# Patient Record
Sex: MALE | Race: WHITE | NOT HISPANIC OR LATINO | Employment: OTHER | URBAN - METROPOLITAN AREA
[De-identification: names, ages, dates, MRNs, and addresses within clinical notes are randomized per-mention and may not be internally consistent; named-entity substitution may affect disease eponyms.]

---

## 2017-08-23 ENCOUNTER — OFFICE VISIT (OUTPATIENT)
Dept: OPHTHALMOLOGY | Facility: CLINIC | Age: 62
End: 2017-08-23

## 2017-08-23 DIAGNOSIS — H40.003 GLAUCOMA SUSPECT OF BOTH EYES: ICD-10-CM

## 2017-08-23 DIAGNOSIS — H52.00 HYPERMETROPIA, UNSPECIFIED LATERALITY: Primary | ICD-10-CM

## 2017-08-23 ASSESSMENT — CONF VISUAL FIELD
OS_NORMAL: 1
OD_NORMAL: 1

## 2017-08-23 ASSESSMENT — SLIT LAMP EXAM - LIDS
COMMENTS: NORMAL
COMMENTS: NORMAL

## 2017-08-23 ASSESSMENT — REFRACTION_MANIFEST
OD_CYLINDER: +0.75
OD_AXIS: 015
OS_AXIS: 015
OD_ADD: +2.50
OS_ADD: +2.50
OS_CYLINDER: +0.50
OS_SPHERE: +1.00
OD_SPHERE: +1.00

## 2017-08-23 ASSESSMENT — REFRACTION_WEARINGRX
OS_AXIS: 008
OD_ADD: +2.25
OD_CYLINDER: +0.50
SPECS_TYPE: PAL
OS_CYLINDER: +0.25
OD_AXIS: 028
OS_SPHERE: +0.50
OS_ADD: +2.25
OD_SPHERE: +0.50

## 2017-08-23 ASSESSMENT — TONOMETRY
OD_IOP_MMHG: 18
OS_IOP_MMHG: 17
IOP_METHOD: APPLANATION

## 2017-08-23 ASSESSMENT — PACHYMETRY
OD_CT(UM): 517
OS_CT(UM): 518

## 2017-08-23 ASSESSMENT — VISUAL ACUITY
METHOD: SNELLEN - LINEAR
OS_CC+: -2
OD_CC: 20/20
OS_CC: 20/20

## 2017-08-23 ASSESSMENT — EXTERNAL EXAM - RIGHT EYE: OD_EXAM: NORMAL

## 2017-08-23 ASSESSMENT — CUP TO DISC RATIO
OS_RATIO: 0.7
OD_RATIO: 0.7

## 2017-08-23 ASSESSMENT — EXTERNAL EXAM - LEFT EYE: OS_EXAM: NORMAL

## 2017-08-23 NOTE — MR AVS SNAPSHOT
After Visit Summary   8/23/2017    Maikel Shaikh    MRN: [id number]           Patient Information     Date Of Birth          1955        Visit Information        Provider Department      8/23/2017 3:40 PM Jude Bill MD [facility]        Today's Diagnoses     Hypermetropia, unspecified laterality - Both Eyes    -  1    Glaucoma suspect of both eyes           Follow-ups after your visit        Follow-up notes from your care team     Return in about 1 year (around 8/23/2018) for Complete Eye Exam, Glaucoma suspect.      Who to contact     Please call your clinic at [phone] to:    Ask questions about your health    Make or cancel appointments    Discuss your medicines    Learn about your test results    Speak to your doctor   If you have compliments or concerns about an experience at your clinic, or if you wish to file a complaint, please contact [facility] Patient Relations at [phone] or email us at [email or web address]         Additional Information About Your Visit        MyChart Information     [employer] gives you secure access to your electronic health record. If you see a primary care provider, you can also send messages to your care team and make appointments. If you have questions, please call your primary care clinic.  If you do not have a primary care provider, please call [phone] and they will assist you.      [employer] is an electronic gateway that provides easy, online access to your medical records. With [employer], you can request a clinic appointment, read your test results, renew a prescription or communicate with your care team.     To access your existing account, please contact your [facility] Clinic or call [phone] for assistance.        Care EveryWhere ID     This is your Care EveryWhere ID. This could be used by other organizations to access your [facility]  records  [id number]         Blood Pressure from Last 3 Encounters:   No data found for BP    Weight from Last 3 Encounters:   No data found for Wt              We Performed the Following     OCT Optic Nerve RNFL Spectralis OU (both eyes)        Primary Care Provider    None Specified       No primary provider on file.        Equal Access to Services     ALONDRA REGALADO : Hadii aad ku hadaddisonchaz Lisha, wamariamada luqadaha, qaybta kaalmada adecolleen, caleb burns colbykendy suárez delialázaro gomez . So [facility] [phone].    ATENCIÓN: Si habla español, tiene a burton disposición servicios gratuitos de asistencia lingüística. Llame al [phone].    We comply with applicable federal civil rights laws and [place] laws. We do not discriminate on the basis of race, color, national origin, age, disability, sex, sexual orientation, or gender identity.            Thank you!     Thank you for choosing [facility]  for your care. Our goal is always to provide you with excellent care. Hearing back from our patients is one way we can continue to improve our services. Please take a few minutes to complete the written survey that you may receive in the mail after your visit with us. Thank you!             Your Updated Medication List - Protect others around you: Learn how to safely use, store and throw away your medicines at www.disposemymeds.org.          This list is accurate as of: 8/23/17 11:59 PM.  Always use your most recent med list.                   Brand Name Dispense Instructions for use Diagnosis    ASACOL PO           ASPIRIN PO           calcium carbonate 1250 MG tablet    OS-JIMI 500 mg [place]. [place]     Take 500 mg by mouth 2 times daily        CRESTOR PO           IRON SUPPLEMENT PO           METFORMIN HCL PO           MULTI-VITAMIN PO           OMEPRAZOLE PO           rOPINIRole HCl 2 MG Tb24 tablet    REQUIP     Take 2 mg by mouth At Bedtime        ZETIA PO

## 2017-08-23 NOTE — NURSING NOTE
Chief Complaints and History of Present Illnesses   Patient presents with     Eye Exam For Diabetes     newly diagnosed 2/17     HPI    Affected eye(s):  Both   Symptoms:        Duration:  1 year   Frequency:  Constant       Do you have eye pain now?:  No      Comments:  No [facility] concerns  No eye medications include OTC  Last A1C: ?  Siena Chavira COT 3:50 PM August 23, 2017

## 2017-11-17 NOTE — PROGRESS NOTES
Assessment & Plan      Maikel Shaikh is a 62 year old male with the following diagnoses:   (H52.00) Hypermetropia, unspecified laterality - Both Eyes  (primary encounter diagnosis)  Comment: Change  Plan: Rx for new glasse    (H40.003) Glaucoma suspect of both eyes  Comment: Stable  Plan: OCT Optic Nerve RNFL Spectralis OU (both eyes)        Normal OU        Follow     -----------------------------------------------------------------------------------      Patient disposition:   Return in about 1 year (around 8/23/2018) for Complete Eye Exam, Glaucoma suspect. or sooner as needed.    Complete documentation of historical and exam elements from today's encounter can  be found in the full encounter summary report (not reduplicated in this progress  note). I personally obtained the chief complaint(s) and history of present illness. I  confirmed and edited as necessary the review of systems, past medical/surgical  history, family history, social history, and examination findings as documented by  others; and I examined the patient myself. I personally reviewed the relevant tests,  images, and reports as documented above. I formulated and edited as necessary the  assessment and plan and discussed the findings and management plan with the  patient and family.    CARLITOS Bill M.D

## 2019-01-30 ENCOUNTER — OFFICE VISIT (OUTPATIENT)
Dept: OPHTHALMOLOGY | Facility: CLINIC | Age: 64
End: 2019-01-30
Payer: COMMERCIAL

## 2019-01-30 DIAGNOSIS — H40.003 GLAUCOMA SUSPECT OF BOTH EYES: ICD-10-CM

## 2019-01-30 DIAGNOSIS — E11.9 TYPE 2 DIABETES MELLITUS WITHOUT OPHTHALMIC MANIFESTATIONS (H): ICD-10-CM

## 2019-01-30 DIAGNOSIS — H52.00 HYPERMETROPIA, UNSPECIFIED LATERALITY: Primary | ICD-10-CM

## 2019-01-30 DIAGNOSIS — H25.13 NUCLEAR SCLEROTIC CATARACT OF BOTH EYES: ICD-10-CM

## 2019-01-30 ASSESSMENT — REFRACTION_MANIFEST
OD_AXIS: 010
OS_SPHERE: +1.00
OS_AXIS: 015
OD_SPHERE: +1.25
OD_ADD: +2.50
OS_ADD: +2.50
OD_CYLINDER: +0.75
OS_CYLINDER: +0.50

## 2019-01-30 ASSESSMENT — REFRACTION_WEARINGRX
OD_CYLINDER: +0.75
OS_ADD: +2.50
OD_ADD: +2.50
SPECS_TYPE: PAL
OD_SPHERE: +1.00
OS_SPHERE: +1.00
OD_AXIS: 015
OS_AXIS: 020
OS_CYLINDER: +0.25

## 2019-01-30 ASSESSMENT — VISUAL ACUITY
CORRECTION_TYPE: GLASSES
OS_CC: J1+
OD_CC: J1+
OS_CC+: +2
OS_CC: 20/20
METHOD: SNELLEN - LINEAR
OD_CC: 20/20
OD_CC+: +2

## 2019-01-30 ASSESSMENT — EXTERNAL EXAM - LEFT EYE: OS_EXAM: NORMAL

## 2019-01-30 ASSESSMENT — TONOMETRY
OS_IOP_MMHG: 20
OD_IOP_MMHG: 18
OS_IOP_MMHG: 16
IOP_METHOD: TONOPEN
OD_IOP_MMHG: 18
IOP_METHOD: APPLANATION

## 2019-01-30 ASSESSMENT — SLIT LAMP EXAM - LIDS
COMMENTS: NORMAL
COMMENTS: NORMAL

## 2019-01-30 ASSESSMENT — CONF VISUAL FIELD
OS_NORMAL: 1
METHOD: COUNTING FINGERS
OD_NORMAL: 1

## 2019-01-30 ASSESSMENT — CUP TO DISC RATIO
OS_RATIO: 0.7
OD_RATIO: 0.7

## 2019-01-30 ASSESSMENT — EXTERNAL EXAM - RIGHT EYE: OD_EXAM: NORMAL

## 2019-01-30 NOTE — PROGRESS NOTES
HPI  Maikel Shaikh is a 63 year old male here for comprehensive eye exam.  Seeing well with current glasses (were remade at his optical by their optometrist last year).  No eye pain or discomfort.    PMH:  Diabetes mellitus 2- Last A1C: 6.7 ~ spring 2018, hypertension, hyperlipidemia  POH:  Glasses for hypermetropia, glaucoma suspect, , no surgery, no trauma  Oc Meds:  none  FH:  Denies any glaucoma, age related macular degeneration, or other known eye diseases       Assessment & Plan      (H52.00) Hypermetropia, unspecified laterality - Both Eyes  (primary encounter diagnosis)  Comment: minimal change   Plan: manifest refraction done and prescription for glasses given - change as needed poor repair only    (H40.003) Glaucoma suspect of both eyes - Both Eyes  Comment: normal [place] [facility] 2016, normal oct 2017, normal intraocular pressure negative fh, +2 both eyes for thin pachymetry, 0.7/0.7 cupping stable  Plan: OCT Optic Nerve RNFL Spectralis OU (both eyes)- normal both eyes         20/18 intraocular pressure adjusted - stable  Continue to monitor for changes    (H25.13) Nuclear sclerotic cataract of both eyes - Both Eyes  Comment: mild not visually significant   Plan: follow     (E11.9) Type 2 diabetes mellitus without ophthalmic manifestations (H)  Comment: Diabetes Mellitus 2 w/o retinopathy   Good a1c   Discussed the importance of tight blood glucose, blood pressure, and cholesterol   control in the prevention of diabetic retinopathy.   Plan:  Recommend yearly dilated eye exam.         -----------------------------------------------------------------------------------    Patient disposition:   Return in about 1 year (around 1/30/2020) for Comprehensive Exam- DM. Call for sooner appointment as needed.    Complete documentation of historical and exam elements from today's encounter can be found in the full encounter summary report (not reduplicated in this progress note). I personally obtained the chief  complaint(s) and history of present illness.  I have confirmed and edited as necessary the CC, HPI, PMH/PSH, social history, FMH, ROS, and exam/neuro findings as obtained by the technician or others. I have examined this patient myself and I personally viewed the image(s) and studies listed above and the documentation reflects my findings and interpretation.  I formulated and edited as necessary the assessment and plan and discussed the findings and management plan with the patient and family.     Melba Lagunas MD

## 2019-10-01 ENCOUNTER — HEALTH MAINTENANCE LETTER (OUTPATIENT)
Age: 64
End: 2019-10-01

## 2021-01-15 ENCOUNTER — HEALTH MAINTENANCE LETTER (OUTPATIENT)
Age: 66
End: 2021-01-15

## 2021-09-04 ENCOUNTER — HEALTH MAINTENANCE LETTER (OUTPATIENT)
Age: 66
End: 2021-09-04

## 2022-02-13 ENCOUNTER — HEALTH MAINTENANCE LETTER (OUTPATIENT)
Age: 67
End: 2022-02-13

## 2022-10-16 ENCOUNTER — HEALTH MAINTENANCE LETTER (OUTPATIENT)
Age: 67
End: 2022-10-16

## 2023-02-10 ENCOUNTER — OFFICE VISIT (OUTPATIENT)
Dept: DERMATOLOGY | Facility: CLINIC | Age: 68
End: 2023-02-10
Payer: COMMERCIAL

## 2023-02-10 DIAGNOSIS — L20.84 INTRINSIC ATOPIC DERMATITIS: Primary | ICD-10-CM

## 2023-02-10 PROCEDURE — 99203 OFFICE O/P NEW LOW 30 MIN: CPT | Performed by: DERMATOLOGY

## 2023-02-10 RX ORDER — LISINOPRIL 10 MG/1
10 TABLET ORAL DAILY
COMMUNITY

## 2023-02-10 RX ORDER — TRIAMCINOLONE ACETONIDE 1 MG/G
CREAM TOPICAL
Qty: 464 G | Refills: 3 | Status: SHIPPED | OUTPATIENT
Start: 2023-02-10

## 2023-02-10 NOTE — LETTER
2/10/2023         RE: Alena Alex  [address]  [place] [address]        Dear Colleague,    Thank you for referring your patient, Alena Alex, to the [facility]. Please see a copy of my visit note below.    Visit Date: 02/10/2023    SUBJECTIVE:  New visit for Rajeev, who states that for quite a few months, he has been having intense itching of his legs.  Cortisone, which he has had prescribed does not seem to be working well topically.  He also has some tags under his right axilla and also a large bump on his back.    OBJECTIVE:  Exam shows a healthy gentleman in no distress.  Present on his legs are some scratch marks and some mild eczematous changes bilateral.  No edema, nothing to be concerned about.  He states that he did have eczema as a child, indicating that he may be an atopic individual.  The tags are very tiny.  On his back is a raised cystic mass of probably 2 x 3 cm that strongly suggests that of a keratin cyst.    ASSESSMENT:  Nonspecific leg eczema, probably related to his atopic status.    PLAN:  Triamcinolone 0.1 cream 1 pound jar, use b.i.d. for 2 weeks and gradually wean down to once daily or once every other day if that is doing well.  Continue CeraVe which he already has.  Reduce soap and water washing of the legs in the shower.  Advised that he call if he wishes to have the cyst removed.  He would likely have to see one of our physicians before surgery would be planned, but I advised that these are quite simple to remove and it can be done under local anesthesia.  Return p.r.n. cyst or difficulties with the eczema.    H Etienne Alvarenga MD        D: 02/10/2023   T: 02/10/2023   MT: CHEPE    Name:     ALENA ALEX  MRN:      [id number]        Account:    [id number]   :      1955           Visit Date: 02/10/2023     Document: [id number]      Again, thank you for allowing me to participate in the care of your patient.         Sincerely,        OVIDIO Alvarenga MD

## 2023-02-10 NOTE — NURSING NOTE
Maikel Shaikh's goals for this visit include:   Chief Complaint   Patient presents with     Skin Check     Patient here for a skin last skin check was last fall, patient does have eczema that is bothering him.Also has some skin tags HX of BCC       He requests these members of his care team be copied on today's visit information:     PCP: Seth Dover    Referring Provider:  Referred Self, MD  No address on file    There were no vitals taken for this visit.    Do you need any medication refills at today's visit? Joceline Penaloza EMT

## 2023-02-11 NOTE — PROGRESS NOTES
Visit Date: 02/10/2023    SUBJECTIVE:  New visit for Rajeev, who states that for quite a few months, he has been having intense itching of his legs.  Cortisone, which he has had prescribed does not seem to be working well topically.  He also has some tags under his right axilla and also a large bump on his back.    OBJECTIVE:  Exam shows a healthy gentleman in no distress.  Present on his legs are some scratch marks and some mild eczematous changes bilateral.  No edema, nothing to be concerned about.  He states that he did have eczema as a child, indicating that he may be an atopic individual.  The tags are very tiny.  On his back is a raised cystic mass of probably 2 x 3 cm that strongly suggests that of a keratin cyst.    ASSESSMENT:  Nonspecific leg eczema, probably related to his atopic status.    PLAN:  Triamcinolone 0.1 cream 1 pound jar, use b.i.d. for 2 weeks and gradually wean down to once daily or once every other day if that is doing well.  Continue CeraVe which he already has.  Reduce soap and water washing of the legs in the shower.  Advised that he call if he wishes to have the cyst removed.  He would likely have to see one of our physicians before surgery would be planned, but I advised that these are quite simple to remove and it can be done under local anesthesia.  Return p.r.n. cyst or difficulties with the eczema.    OVIDIO Alvarenga MD        D: 02/10/2023   T: 02/10/2023   MT: CHEPE    Name:     ALENA ALEX  MRN:      [id number]        Account:    [id number]   :      1955           Visit Date: 02/10/2023     Document: [id number]

## 2023-02-16 ENCOUNTER — TELEPHONE (OUTPATIENT)
Dept: DERMATOLOGY | Facility: CLINIC | Age: 68
End: 2023-02-16
Payer: COMMERCIAL

## 2023-02-16 NOTE — TELEPHONE ENCOUNTER
[facility] Call Center    Phone Message    May a detailed message be left on voicemail: yes     Reason for Call: Appointment Intake    Referring Provider Name: Dr. Alvarenga  Diagnosis and/or Symptoms: Cyst on back   Pt was in 02/10/23 and Dr. Alvarenga Dx the cyst on the pt's back. Pt was told to call clinic if he would like an Appt for cyst.   Pt would like to schedule Appt. Please call pt back to discuss. Thanks     Action Taken: Message routed to:  [facility] ([facility] Derm    Travel Screening: Not Applicable

## 2023-03-26 ENCOUNTER — HEALTH MAINTENANCE LETTER (OUTPATIENT)
Age: 68
End: 2023-03-26

## 2023-04-19 ENCOUNTER — TELEPHONE (OUTPATIENT)
Dept: DERMATOLOGY | Facility: CLINIC | Age: 68
End: 2023-04-19
Payer: COMMERCIAL

## 2023-04-19 NOTE — TELEPHONE ENCOUNTER
Reached out to patient to go over excision previsit checklist.  Patient is currently driving and would like to postpone conversation.    Will reach out to patient tomorrow.

## 2023-04-24 NOTE — TELEPHONE ENCOUNTER
Excision/Mohs previsit information                                                    Diagnosis: Cyst  Site(s): back    Over the counter Chlorhexidine surgical soap to wash all skin below the belly button twice before surgery should be recommended for the following:  - Surgical sites below the waist  - Immunosuppressed  - Previous surgical site infection  - Anticipated wound care challenges    Medication & Allergy Information                                                      Review and update allergy and medication list.    Do you take the following medications:    Coumadin, Eliquis, Pradaxa, Xarelto:  NO   -If on Coumadin, INR should be checked within 7 days of surgery.  Range should be 3.5 or less or within therapeutic range.    Past Medical History                                                    Do you currently or have you previously had any of the following conditions:      Hepatitis:  NO    HIV/AIDS:  NO    Prolonged bleeding or bleeding disorder:  NO    Pacemaker or Defibrillator:  NO.      History of artificial or heart valve replacement:  NO    Endocarditis (inflammation of the inner lining of the heart's chambers and valves):  NO    Have you ever had a prosthetic joint infection:  NO    Pregnant or Breastfeeding:  N/A    Mobility device (wheelchair, transfer difficulty): NO    Important Reminders:                                                        Ok to take all of their medications as prescribed    Patients can eat, no need to be fasting    Patient will not be able to get the site wet for 48 hrs    No submerging wound in standing water (lake, pool, bathtub, hot tub) for 2 weeks    No physical activity for 48 hrs (further restrictions will be discussed by MD at time of visit)    If any positives, send to RN for further review  Arin Emanuel RN

## 2023-05-09 ENCOUNTER — OFFICE VISIT (OUTPATIENT)
Dept: DERMATOLOGY | Facility: CLINIC | Age: 68
End: 2023-05-09
Payer: COMMERCIAL

## 2023-05-09 VITALS — DIASTOLIC BLOOD PRESSURE: 76 MMHG | SYSTOLIC BLOOD PRESSURE: 130 MMHG | HEART RATE: 44 BPM

## 2023-05-09 DIAGNOSIS — L72.0 EIC (EPIDERMAL INCLUSION CYST): ICD-10-CM

## 2023-05-09 DIAGNOSIS — L72.0 EPIDERMOID CYST OF SKIN OF BACK: Primary | ICD-10-CM

## 2023-05-09 PROCEDURE — 11403 EXC TR-EXT B9+MARG 2.1-3CM: CPT | Performed by: DERMATOLOGY

## 2023-05-09 PROCEDURE — 88304 TISSUE EXAM BY PATHOLOGIST: CPT | Performed by: DERMATOLOGY

## 2023-05-09 PROCEDURE — 12032 INTMD RPR S/A/T/EXT 2.6-7.5: CPT | Performed by: DERMATOLOGY

## 2023-05-09 RX ORDER — LEVOCETIRIZINE DIHYDROCHLORIDE 5 MG/1
5 TABLET, FILM COATED ORAL EVERY EVENING
COMMUNITY

## 2023-05-09 ASSESSMENT — PAIN SCALES - GENERAL: PAINLEVEL: NO PAIN (0)

## 2023-05-09 NOTE — PROGRESS NOTES
DERMATOLOGY EXCISION PROCEDURE NOTE    Dermatology Problem List:      NAME OF PROCEDURE: Excision complex layered linear closure  Staff surgeon: Dr. Juan Hayden   Resident:   Scrub Nurse: Albertina Arceo CMA    PRE-OPERATIVE DIAGNOSIS:  cyst  POST-OPERATIVE DIAGNOSIS: Same   LOCATION: Left mid perispinal back   FINAL REPAIR LENGTH: 4.0 cm   ANESTHESIA: 1% lidocaine with 1:100,000 epinephrine    INDICATIONS: This patient presented with a 2.8 x 2.4 cm cyst. Excision was indicated. We discussed the principles of treatment and most likely complications including scarring, bleeding, infection, incomplete excision, wound dehiscence, pain, nerve damage, and recurrence. Informed consent was obtained and the patient underwent the procedure as follows:    PROCEDURE: The patient was taken to the operative suite. Time-out was performed.  The treatment area was anesthetized with 1% lidocaine with epinephrine. The area was prepped with Chlorhexidine and rinsed with sterile saline and draped with sterile towels. The lesion was delineated and excised down to subcutaneous fat in a elliptical manner. Hemostasis was obtained by electrocoagulation.     REPAIR: An complex layered linear closure was selected as the procedure which would maximally preserve both function and cosmesis.    After the excision of the tumor, the area was carefully undermined. Hemostasis was obtained with heat cautery.   Closure was oriented so that the wound was in the patient's natural skin tension lines. Retaining sutures were placed with 3.0 Vicryl in the fascia.  The subcutaneous and dermal layers were then closed with 3-0  vicryl sutures. The epidermis was then carefully approximated along the length of the wound using 4-0 Monocryl running subcuticular sutures.     Estimated blood loss was less than 10 ml for all surgical sites. A sterile pressure dressing was applied and wound care instructions, with a written handout, were given. The patient was discharged  from [facility] alert and ambulatory.    The patient elected for pathology results to automatically release and understands that the clinical staff will contact them as soon as possible to notify them of the results.      Dr. Hayden was immediately available for the entire surgery and was physicially present for the key portions of the procedure.    Anatomic Pathology Results: pending    Clinical Follow-Up: as needed    Staff Involved:  Staff Only      Attending attestation:  I personally performed the entire procedure.  I have reviewed the note and edited it as necessary, and agree with its contents.    Juan Hayden M.D.  Professor  Director of Dermatologic Surgery  Department of Dermatology  [facility]    Dermatology Surgery Clinic  [facility]  [address] [place], [place] [id number]

## 2023-05-09 NOTE — NURSING NOTE
Maikel Shaikh's chief complaint for this visit includes:  Chief Complaint   Patient presents with     Procedure     Excision of cyst from left mid perispinal back     PCP: Seth Dover    Referring Provider:  No referring provider defined for this encounter.    /76 (BP Location: Left arm, Patient Position: Sitting, Cuff Size: Adult Large)   Pulse (!) 44   No Pain (0)        Allergies   Allergen Reactions     Penicillin G      Sulfa Antibiotics          Do you need any medication refills at today's visit? Joceline Arceo CMA

## 2023-05-09 NOTE — NURSING NOTE
The following medication was given:     MEDICATION:  Lidocaine with epinephrine 1% 1:[id number]  ROUTE: SQ  SITE: see procedure note  DOSE: 12 ml  LOT #: [id number]  : [employer]  EXPIRATION DATE: 09/30/2024  NDC#: 47356-633-52  Was there drug waste? no  Multi-dose vial: Yes    Albertina Arceo CMA  May 9, 2023      Mastisol, Steri-Strips, Tegaderm and pressure dressing applied to excision site on left mid perispinal back.  Wound care instructions reviewed with patient and AVS provided.  Patient verbalized understanding.  Patient will follow up for suture removal: N/A.  No further questions or concerns at this time.

## 2023-05-09 NOTE — PATIENT INSTRUCTIONS
Excision Wound Care Instructions with Steri Strips    Possible complications of any surgical procedure are bleeding, infection, scarring, alteration in skin color and sensation, muscle weakness in the area, wound dehiscence or seperation, or recurrence of the lesion or disease. On occasion, after healing, a secondary procedure or revision may be recommended in order to obtain the best cosmetic or functional result.     After your surgery, a pressure bandage will be placed over the area that has sutures. This will help prevent bleeding. Please, follow these instructions as they will help you to prevent complications as your wound heals.    For the First 48 hours After Surgery:    Leave the pressure bandage on and keep it dry. If it should come loose, you may retape it, but do not take it off.    Relax and take it easy. Do not do any vigorous exercise, heavy lifting, or bending forward. This could cause the wound to bleed.    Post-operative pain is usually mild. You may alternate between 1000 mg of Tylenol (acetaminophen) and 400 mg of Ibuprofen every 4 hours.  Do not take more than 4,000 mg of acetaminophen and more than 3200 mg of Ibuprofen in a 24 hr period.  Avoid alcohol and vitamin E as these may increase your tendency to bleed.    You may put an ice pack around the bandaged area for 20 minutes every 2-3 hours. This may help reduce swelling, bruising, and pain. Make sure the ice pack is waterproof so that the pressure bandage does not get wet.     You may see a small amount of drainage or blood on your pressure bandage.  This is normal.  However, if drainage or bleeding continues or saturates the bandage, you will need to apply firm pressure over the bandage with a washcloth for 15 minutes. If bleeding continues after applying pressure for 15 minutes then go to the nearest emergency room.      48 Hours After Surgery:    Remove outer white bandage down to clear plastic film (Tegaderm).    Leave the clear plastic  film (Tegaderm) on for up to 2 weeks, as long as it is intact.  If it falls off prior to 2 weeks follow daily wound care below.  If it stays intact for the full 2 weeks, then remove and treat as normal, healthy skin.      Daily Wound Care (if Tegaderm and Steri-Strips fall off prior to 2 weeks):    Wash wound with a mild soap and water.  Use caution when washing the wound, be gentle and do not let the forceful shower stream hit the wound directly. DO NOT WASH WITH HYDROGEN PEROXIDE AS THIS MIGHT CAUSE THE STITCHES TO DISSOLVE FASTER THAN WHAT WE WANT.    Pat dry.    Apply Vaseline (from a new container or tube) over the suture line with a Q-tip until it is completely healed. It is very important to keep the wound continuously moist, as wounds heal best in a moist environment.    Keep the site covered until it's healed.  You can cover it with a Telfa (non-stick) dressing and tape or a band-aid until healed with normal, healthy skin.      Call Us If:    You have pain that is not controlled with Tylenol/Ibuprofen.    You have signs or symptoms of an infection, such as: fever over 100 degrees F, redness, warmth, or foul-smelling or yellow/creamy drainage from the wound.      Who should I call with questions?  [facility]: [phone]  [facility]: [phone]  For urgent needs outside of business hours call the [facility] at [phone] and ask for the dermatology resident on call

## 2023-05-09 NOTE — LETTER
5/9/2023         RE: Maikel Shaikh  [address]  [facility] [address]        Dear Colleague,    Thank you for referring your patient, Maikel Shaikh, to the [facility]. Please see a copy of my visit note below.    DERMATOLOGY EXCISION PROCEDURE NOTE    Dermatology Problem List:      NAME OF PROCEDURE: Excision complex layered linear closure  Staff surgeon: Dr. Juan Hayden   Resident:   Scrub Nurse: Albertina Arceo CMA    PRE-OPERATIVE DIAGNOSIS:  cyst  POST-OPERATIVE DIAGNOSIS: Same   LOCATION: Left mid perispinal back   FINAL REPAIR LENGTH: 4.0 cm   ANESTHESIA: 1% lidocaine with 1:100,000 epinephrine    INDICATIONS: This patient presented with a 2.8 x 2.4 cm cyst. Excision was indicated. We discussed the principles of treatment and most likely complications including scarring, bleeding, infection, incomplete excision, wound dehiscence, pain, nerve damage, and recurrence. Informed consent was obtained and the patient underwent the procedure as follows:    PROCEDURE: The patient was taken to the operative suite. Time-out was performed.  The treatment area was anesthetized with 1% lidocaine with epinephrine. The area was prepped with Chlorhexidine and rinsed with sterile saline and draped with sterile towels. The lesion was delineated and excised down to subcutaneous fat in a elliptical manner. Hemostasis was obtained by electrocoagulation.     REPAIR: An complex layered linear closure was selected as the procedure which would maximally preserve both function and cosmesis.    After the excision of the tumor, the area was carefully undermined. Hemostasis was obtained with heat cautery.   Closure was oriented so that the wound was in the patient's natural skin tension lines. Retaining sutures were placed with 3.0 Vicryl in the fascia.  The subcutaneous and dermal layers were then closed with 3-0  vicryl sutures. The epidermis was then carefully approximated along the length of the  wound using 4-0 Monocryl running subcuticular sutures.     Estimated blood loss was less than 10 ml for all surgical sites. A sterile pressure dressing was applied and wound care instructions, with a written handout, were given. The [facility] and ambulatory.    The patient elected for pathology results to automatically release and understands that the clinical staff will contact them as soon as possible to notify them of the results.      Dr. Hayden was immediately available for the entire surgery and was physicially present for the key portions of the procedure.    Anatomic Pathology Results: pending    Clinical Follow-Up: as needed    Staff Involved:  Staff Only      Attending attestation:  I personally performed the entire procedure.  I have reviewed the note and edited it as necessary, and agree with its contents.    Juan Hayden M.D.  Professor  Director of Dermatologic Surgery  Department of Dermatology  [facility]    D[facility]atology Surgery Clinic  [facility] [place] [address]      Again, thank you for allowing me to participate in the care of your patient.        Sincerely,        Juan Hayden MD

## 2023-05-11 LAB
PATH REPORT.COMMENTS IMP SPEC: NORMAL
PATH REPORT.COMMENTS IMP SPEC: NORMAL
PATH REPORT.FINAL DX SPEC: NORMAL
PATH REPORT.GROSS SPEC: NORMAL
PATH REPORT.MICROSCOPIC SPEC OTHER STN: NORMAL
PATH REPORT.RELEVANT HX SPEC: NORMAL

## 2024-06-01 ENCOUNTER — HEALTH MAINTENANCE LETTER (OUTPATIENT)
Age: 69
End: 2024-06-01

## 2025-06-14 ENCOUNTER — HEALTH MAINTENANCE LETTER (OUTPATIENT)
Age: 70
End: 2025-06-14